# Patient Record
Sex: FEMALE | Race: WHITE | Employment: FULL TIME | URBAN - METROPOLITAN AREA
[De-identification: names, ages, dates, MRNs, and addresses within clinical notes are randomized per-mention and may not be internally consistent; named-entity substitution may affect disease eponyms.]

---

## 2017-06-04 ENCOUNTER — APPOINTMENT (OUTPATIENT)
Dept: ULTRASOUND IMAGING | Age: 27
End: 2017-06-04
Attending: EMERGENCY MEDICINE
Payer: COMMERCIAL

## 2017-06-04 ENCOUNTER — HOSPITAL ENCOUNTER (EMERGENCY)
Age: 27
Discharge: HOME | End: 2017-06-04
Attending: EMERGENCY MEDICINE
Payer: COMMERCIAL

## 2017-06-04 VITALS
HEIGHT: 64 IN | BODY MASS INDEX: 42.68 KG/M2 | DIASTOLIC BLOOD PRESSURE: 81 MMHG | HEART RATE: 74 BPM | TEMPERATURE: 98.7 F | RESPIRATION RATE: 20 BRPM | OXYGEN SATURATION: 100 % | SYSTOLIC BLOOD PRESSURE: 116 MMHG | WEIGHT: 250 LBS

## 2017-06-04 DIAGNOSIS — N76.0 BV (BACTERIAL VAGINOSIS): ICD-10-CM

## 2017-06-04 DIAGNOSIS — B96.89 BV (BACTERIAL VAGINOSIS): ICD-10-CM

## 2017-06-04 DIAGNOSIS — R10.2 PELVIC PAIN: Primary | ICD-10-CM

## 2017-06-04 DIAGNOSIS — N88.8 NABOTHIAN CYST: ICD-10-CM

## 2017-06-04 LAB
ALBUMIN SERPL BCP-MCNC: 3.7 G/DL (ref 3.5–5)
ALBUMIN/GLOB SERPL: 0.9 {RATIO} (ref 1.1–2.2)
ALP SERPL-CCNC: 53 U/L (ref 45–117)
ALT SERPL-CCNC: 40 U/L (ref 12–78)
ANION GAP BLD CALC-SCNC: 9 MMOL/L (ref 5–15)
APPEARANCE UR: ABNORMAL
AST SERPL W P-5'-P-CCNC: ABNORMAL U/L (ref 15–37)
BACTERIA URNS QL MICRO: ABNORMAL /HPF
BASOPHILS # BLD AUTO: 0 K/UL (ref 0–0.1)
BASOPHILS # BLD: 0 % (ref 0–1)
BILIRUB SERPL-MCNC: 0.4 MG/DL (ref 0.2–1)
BILIRUB UR QL: NEGATIVE
BUN SERPL-MCNC: 18 MG/DL (ref 6–20)
BUN/CREAT SERPL: 24 (ref 12–20)
CALCIUM SERPL-MCNC: 8.4 MG/DL (ref 8.5–10.1)
CHLORIDE SERPL-SCNC: 107 MMOL/L (ref 97–108)
CLUE CELLS VAG QL WET PREP: NORMAL
CO2 SERPL-SCNC: 26 MMOL/L (ref 21–32)
COLOR UR: ABNORMAL
CREAT SERPL-MCNC: 0.76 MG/DL (ref 0.55–1.02)
DIFFERENTIAL METHOD BLD: NORMAL
EOSINOPHIL # BLD: 0.3 K/UL (ref 0–0.4)
EOSINOPHIL NFR BLD: 4 % (ref 0–7)
EPITH CASTS URNS QL MICRO: ABNORMAL /LPF
ERYTHROCYTE [DISTWIDTH] IN BLOOD BY AUTOMATED COUNT: 12.3 % (ref 11.5–14.5)
GLOBULIN SER CALC-MCNC: 4.1 G/DL (ref 2–4)
GLUCOSE SERPL-MCNC: 93 MG/DL (ref 65–100)
GLUCOSE UR STRIP.AUTO-MCNC: NEGATIVE MG/DL
HCG UR QL: NEGATIVE
HCT VFR BLD AUTO: 39 % (ref 35–47)
HGB BLD-MCNC: 13.1 G/DL (ref 11.5–16)
HGB UR QL STRIP: NEGATIVE
KETONES UR QL STRIP.AUTO: NEGATIVE MG/DL
KOH PREP SPEC: NORMAL
LEUKOCYTE ESTERASE UR QL STRIP.AUTO: NEGATIVE
LIPASE SERPL-CCNC: 116 U/L (ref 73–393)
LYMPHOCYTES # BLD AUTO: 34 % (ref 12–49)
LYMPHOCYTES # BLD: 2.7 K/UL (ref 0.8–3.5)
MCH RBC QN AUTO: 30.4 PG (ref 26–34)
MCHC RBC AUTO-ENTMCNC: 33.6 G/DL (ref 30–36.5)
MCV RBC AUTO: 90.5 FL (ref 80–99)
MONOCYTES # BLD: 0.6 K/UL (ref 0–1)
MONOCYTES NFR BLD AUTO: 7 % (ref 5–13)
NEUTS SEG # BLD: 4.4 K/UL (ref 1.8–8)
NEUTS SEG NFR BLD AUTO: 55 % (ref 32–75)
NITRITE UR QL STRIP.AUTO: NEGATIVE
PH UR STRIP: 6 [PH] (ref 5–8)
PLATELET # BLD AUTO: 281 K/UL (ref 150–400)
POTASSIUM SERPL-SCNC: ABNORMAL MMOL/L (ref 3.5–5.1)
PROT SERPL-MCNC: 7.8 G/DL (ref 6.4–8.2)
PROT UR STRIP-MCNC: NEGATIVE MG/DL
RBC # BLD AUTO: 4.31 M/UL (ref 3.8–5.2)
RBC #/AREA URNS HPF: ABNORMAL /HPF (ref 0–5)
RBC MORPH BLD: NORMAL
SERVICE CMNT-IMP: NORMAL
SODIUM SERPL-SCNC: 142 MMOL/L (ref 136–145)
SP GR UR REFRACTOMETRY: 1.03 (ref 1–1.03)
T VAGINALIS VAG QL WET PREP: NORMAL
UA: UC IF INDICATED,UAUC: ABNORMAL
UROBILINOGEN UR QL STRIP.AUTO: 0.2 EU/DL (ref 0.2–1)
WBC # BLD AUTO: 8 K/UL (ref 3.6–11)
WBC URNS QL MICRO: ABNORMAL /HPF (ref 0–4)

## 2017-06-04 PROCEDURE — 87210 SMEAR WET MOUNT SALINE/INK: CPT | Performed by: EMERGENCY MEDICINE

## 2017-06-04 PROCEDURE — 76830 TRANSVAGINAL US NON-OB: CPT

## 2017-06-04 PROCEDURE — 85025 COMPLETE CBC W/AUTO DIFF WBC: CPT | Performed by: EMERGENCY MEDICINE

## 2017-06-04 PROCEDURE — 81001 URINALYSIS AUTO W/SCOPE: CPT | Performed by: EMERGENCY MEDICINE

## 2017-06-04 PROCEDURE — 87086 URINE CULTURE/COLONY COUNT: CPT | Performed by: EMERGENCY MEDICINE

## 2017-06-04 PROCEDURE — 76856 US EXAM PELVIC COMPLETE: CPT

## 2017-06-04 PROCEDURE — 83690 ASSAY OF LIPASE: CPT | Performed by: EMERGENCY MEDICINE

## 2017-06-04 PROCEDURE — 80053 COMPREHEN METABOLIC PANEL: CPT | Performed by: EMERGENCY MEDICINE

## 2017-06-04 PROCEDURE — 99284 EMERGENCY DEPT VISIT MOD MDM: CPT

## 2017-06-04 PROCEDURE — 87491 CHLMYD TRACH DNA AMP PROBE: CPT | Performed by: EMERGENCY MEDICINE

## 2017-06-04 PROCEDURE — 36415 COLL VENOUS BLD VENIPUNCTURE: CPT | Performed by: EMERGENCY MEDICINE

## 2017-06-04 PROCEDURE — 81025 URINE PREGNANCY TEST: CPT

## 2017-06-04 RX ORDER — SODIUM CHLORIDE 0.9 % (FLUSH) 0.9 %
5-10 SYRINGE (ML) INJECTION AS NEEDED
Status: DISCONTINUED | OUTPATIENT
Start: 2017-06-04 | End: 2017-06-05 | Stop reason: HOSPADM

## 2017-06-04 RX ORDER — METRONIDAZOLE 500 MG/1
500 TABLET ORAL 2 TIMES DAILY
Qty: 14 TAB | Refills: 0 | Status: SHIPPED | OUTPATIENT
Start: 2017-06-04 | End: 2017-06-11

## 2017-06-04 RX ORDER — SODIUM CHLORIDE 0.9 % (FLUSH) 0.9 %
5-10 SYRINGE (ML) INJECTION EVERY 8 HOURS
Status: DISCONTINUED | OUTPATIENT
Start: 2017-06-04 | End: 2017-06-05 | Stop reason: HOSPADM

## 2017-06-04 NOTE — ED TRIAGE NOTES
Pt has pelvic pain. Denies diarrhea, denies bleeding. Pt does not have periods for years. Denies urinary sx.

## 2017-06-04 NOTE — ED PROVIDER NOTES
HPI Comments: 24 y.o. A4 female with past medical history significant for appendectomy and seasonal allergies who presents ambulatory from home accompanied by her boyfriend with chief complaint of pelvic pain. Pt reports having pelvic pain, described as \"feels like menstrual cramps\" or \"like my ovaries\", for the past 6-7 years after having an elective . She states her pain has been intermittent since then, but is worse with standing straight or reaching over her head and better with sitting. She describes current pain as a 4/10. She has been taking Extra Strenght Tylenol which provides temporary relief. She admits she was seen by [facility] a few weeks ago for her symptoms and was given a 4 day course of antibiotics for a possible UTI. However, her symptoms did not improve. She also describes intermittent nausea and vomiting since onset of symptoms, but no vaginal bleeding, vaginal discharge, diarrhea, constipation, fever, or dysuria. She says her LMP was about 5 years ago but she is not currently on oral contraceptives or other hormonal therapy. She reports she was seen by her GYN for this in the past and was told that everything was \"normal\". There are no other acute medical concerns at this time. Social hx: Occasional smoker; rare EtOH use; no illicit drug use. Allergies: None  PCP: No primary care provider on file. Note written by Blanche Zavala. Julienne Gallardo, as dictated by Paty Matute, DO 7:07 PM      The history is provided by the patient. No past medical history on file. No past surgical history on file. No family history on file. Social History     Social History    Marital status: SINGLE     Spouse name: N/A    Number of children: N/A    Years of education: N/A     Occupational History    Not on file.      Social History Main Topics    Smoking status: Not on file    Smokeless tobacco: Not on file    Alcohol use Not on file    Drug use: Not on file    Sexual activity: Not on file     Other Topics Concern    Not on file     Social History Narrative    No narrative on file     ALLERGIES: Review of patient's allergies indicates no known allergies. Review of Systems   Constitutional: Negative for appetite change, chills, fever and unexpected weight change. HENT: Negative for ear pain, hearing loss, rhinorrhea and trouble swallowing. Eyes: Negative for pain and visual disturbance. Respiratory: Negative for cough, chest tightness and shortness of breath. Cardiovascular: Negative for chest pain and palpitations. Gastrointestinal: Positive for nausea and vomiting. Negative for abdominal distention, abdominal pain and blood in stool. Genitourinary: Positive for pelvic pain. Negative for dysuria, hematuria and urgency. Musculoskeletal: Negative for back pain and myalgias. Skin: Negative for rash. Neurological: Negative for dizziness, syncope, weakness and numbness. Psychiatric/Behavioral: Negative for confusion and suicidal ideas. All other systems reviewed and are negative. Vitals:    06/04/17 1903   BP: 128/71   Pulse: 76   Resp: 18   Temp: 98.1 °F (36.7 °C)   SpO2: 98%   Weight: 113.4 kg (250 lb)   Height: 5' 4\" (1.626 m)            Physical Exam   Constitutional: She is oriented to person, place, and time. She appears well-developed and well-nourished. No distress. HENT:   Head: Normocephalic and atraumatic. Right Ear: External ear normal.   Left Ear: External ear normal.   Nose: Nose normal.   Mouth/Throat: Oropharynx is clear and moist. No oropharyngeal exudate. Eyes: Conjunctivae and EOM are normal. Pupils are equal, round, and reactive to light. Right eye exhibits no discharge. Left eye exhibits no discharge. No scleral icterus. Neck: Normal range of motion. Neck supple. No JVD present. No tracheal deviation present. Cardiovascular: Normal rate, regular rhythm, normal heart sounds and intact distal pulses.   Exam reveals no gallop and no friction rub. No murmur heard. Pulmonary/Chest: Effort normal and breath sounds normal. No stridor. No respiratory distress. She has no decreased breath sounds. She has no wheezes. She has no rhonchi. She has no rales. She exhibits no tenderness. Abdominal: Soft. Bowel sounds are normal. She exhibits no distension. There is tenderness in the suprapubic area. There is no rebound and no guarding. Genitourinary: No labial fusion. There is no rash, tenderness, lesion or injury on the right labia. There is no rash, tenderness, lesion or injury on the left labia. Cervix exhibits no motion tenderness, no discharge and no friability. No tenderness in the vagina. No foreign body in the vagina. No vaginal discharge found. Musculoskeletal: Normal range of motion. She exhibits no edema or tenderness. Neurological: She is alert and oriented to person, place, and time. She has normal strength and normal reflexes. No cranial nerve deficit or sensory deficit. She exhibits normal muscle tone. Coordination normal. GCS eye subscore is 4. GCS verbal subscore is 5. GCS motor subscore is 6. Skin: Skin is warm and dry. No rash noted. She is not diaphoretic. No erythema. No pallor. Psychiatric: She has a normal mood and affect. Her behavior is normal. Judgment and thought content normal.   Nursing note and vitals reviewed. Note written by Jie Edwards.  [facility], as dictated by Sula Najjar, DO 7:06 PM    MDM  Number of Diagnoses or Management Options  BV (bacterial vaginosis):   Nabothian cyst:   Pelvic pain:      Amount and/or Complexity of Data Reviewed  Clinical lab tests: ordered and reviewed  Tests in the radiology section of CPT®: ordered and reviewed    Risk of Complications, Morbidity, and/or Mortality  Presenting problems: moderate  Diagnostic procedures: moderate  Management options: moderate    Patient Progress  Patient progress: stable    ED Course       Procedures  Chief Complaint Patient presents with    Pelvic Pain       12:56 AM  The patients presenting problems have been discussed, and they are in agreement with the care plan formulated and outlined with them. I have encouraged them to ask questions as they arise throughout their visit. MEDICATIONS GIVEN:  Medications   sodium chloride (NS) flush 5-10 mL (not administered)   sodium chloride (NS) flush 5-10 mL (not administered)       LABS REVIEWED:  Recent Results (from the past 24 hour(s))   URINALYSIS W/ REFLEX CULTURE    Collection Time: 06/04/17  8:27 PM   Result Value Ref Range    Color YELLOW/STRAW      Appearance CLOUDY (A) CLEAR      Specific gravity 1.030 1.003 - 1.030      pH (UA) 6.0 5.0 - 8.0      Protein NEGATIVE  NEG mg/dL    Glucose NEGATIVE  NEG mg/dL    Ketone NEGATIVE  NEG mg/dL    Bilirubin NEGATIVE  NEG      Blood NEGATIVE  NEG      Urobilinogen 0.2 0.2 - 1.0 EU/dL    Nitrites NEGATIVE  NEG      Leukocyte Esterase NEGATIVE  NEG      WBC 0-4 0 - 4 /hpf    RBC 0-5 0 - 5 /hpf    Epithelial cells MANY (A) FEW /lpf    Bacteria 1+ (A) NEG /hpf    UA:UC IF INDICATED URINE CULTURE ORDERED (A) CNI     CBC WITH AUTOMATED DIFF    Collection Time: 06/04/17  8:27 PM   Result Value Ref Range    WBC 8.0 3.6 - 11.0 K/uL    RBC 4.31 3.80 - 5.20 M/uL    HGB 13.1 11.5 - 16.0 g/dL    HCT 39.0 35.0 - 47.0 %    MCV 90.5 80.0 - 99.0 FL    MCH 30.4 26.0 - 34.0 PG    MCHC 33.6 30.0 - 36.5 g/dL    RDW 12.3 11.5 - 14.5 %    PLATELET 539 728 - 101 K/uL    NEUTROPHILS 55 32 - 75 %    LYMPHOCYTES 34 12 - 49 %    MONOCYTES 7 5 - 13 %    EOSINOPHILS 4 0 - 7 %    BASOPHILS 0 0 - 1 %    ABS. NEUTROPHILS 4.4 1.8 - 8.0 K/UL    ABS. LYMPHOCYTES 2.7 0.8 - 3.5 K/UL    ABS. MONOCYTES 0.6 0.0 - 1.0 K/UL    ABS. EOSINOPHILS 0.3 0.0 - 0.4 K/UL    ABS.  BASOPHILS 0.0 0.0 - 0.1 K/UL    DF MANUAL      RBC COMMENTS NORMOCYTIC, NORMOCHROMIC     METABOLIC PANEL, COMPREHENSIVE    Collection Time: 06/04/17  8:27 PM   Result Value Ref Range    Sodium 142 136 - 145 mmol/L    Potassium HEMOLYZED,RECOLLECT REQUESTED 3.5 - 5.1 mmol/L    Chloride 107 97 - 108 mmol/L    CO2 26 21 - 32 mmol/L    Anion gap 9 5 - 15 mmol/L    Glucose 93 65 - 100 mg/dL    BUN 18 6 - 20 MG/DL    Creatinine 0.76 0.55 - 1.02 MG/DL    BUN/Creatinine ratio 24 (H) 12 - 20      GFR est AA >60 >60 ml/min/1.73m2    GFR est non-AA >60 >60 ml/min/1.73m2    Calcium 8.4 (L) 8.5 - 10.1 MG/DL    Bilirubin, total 0.4 0.2 - 1.0 MG/DL    ALT (SGPT) 40 12 - 78 U/L    AST (SGOT) HEMOLYZED,RECOLLECT REQUESTED 15 - 37 U/L    Alk. phosphatase 53 45 - 117 U/L    Protein, total 7.8 6.4 - 8.2 g/dL    Albumin 3.7 3.5 - 5.0 g/dL    Globulin 4.1 (H) 2.0 - 4.0 g/dL    A-G Ratio 0.9 (L) 1.1 - 2.2     LIPASE    Collection Time: 06/04/17  8:27 PM   Result Value Ref Range    Lipase 116 73 - 393 U/L   KOH, OTHER SOURCES    Collection Time: 06/04/17  8:45 PM   Result Value Ref Range    Special Requests: NO SPECIAL REQUESTS      KOH NO YEAST SEEN     WET PREP    Collection Time: 06/04/17  8:45 PM   Result Value Ref Range    Clue cells CLUE CELLS PRESENT      Wet prep NO TRICHOMONAS SEEN     HCG URINE, QL. - POC    Collection Time: 06/04/17  8:49 PM   Result Value Ref Range    Pregnancy test,urine (POC) NEGATIVE  NEG         VITAL SIGNS:  Patient Vitals for the past 12 hrs:   Temp Pulse Resp BP SpO2   06/04/17 2122 98.7 °F (37.1 °C) 74 20 116/81 100 %   06/04/17 1903 98.1 °F (36.7 °C) 76 18 128/71 98 %       RADIOLOGY RESULTS:  The following have been ordered and reviewed:  US PELV NON OBS   Final Result      US TRANSVAGINAL   Final Result        Study Result      HISTORY: Pelvic pain     Technique: Routine transpelvic ultrasound images were obtained as well as  transvaginal images for better definition of the uterus and adnexa.     FINDINGS: The transpelvic images demonstrate a nondistended urinary bladder  without evidence of filling defect. The uterus measures 7.3 x 4.7 x 3.1 cm.   Myometrial echogenicity is normal. The endometrial stripe measures 6 mm by  transpelvic technique. The ovaries are not visualized by transpelvic technique  due to underdistention of the bladder.     Transvaginal images demonstrate a normal endometrial stripe measuring 5 mm in  thickness. No myometrial mass is seen. The cervix contains a nabothian cyst.  The right ovary measures 2.7 x 2.5 x 2.9 cm. The left ovary measures 2.8 x 2.7  x 2.3 cm. There is intact vascularity to both ovaries. No adnexal mass or  pathological cyst is seen. There is no pelvic free fluid.      IMPRESSION  IMPRESSION:  Normal pelvic ultrasound. PROGRESS NOTES:  Discussed results and plan with patient. Patient will be discharged home with OB/Gyn followup. Patient instructed to return to the emergency room for any worsening symptoms or any other concerns. DIAGNOSIS:    1. Pelvic pain    2. BV (bacterial vaginosis)    3. Nabothian cyst        PLAN:  Follow-up Information     Follow up With Details Comments Contact Info    your OB/Gyn Schedule an appointment as soon as possible for a visit      [facility] EMERGENCY DEPT  If symptoms worsen [address] [facility]  [phone]        Discharge Medication List as of 6/4/2017 10:04 PM      START taking these medications    Details   metroNIDAZOLE (FLAGYL) 500 mg tablet Take 1 Tab by mouth two (2) times a day for 7 days. , Print, Disp-14 Tab, R-0               ED COURSE: The patients hospital course has been uncomplicated.

## 2017-06-05 LAB
C TRACH DNA SPEC QL NAA+PROBE: NEGATIVE
N GONORRHOEA DNA SPEC QL NAA+PROBE: NEGATIVE
SAMPLE TYPE: NORMAL
SERVICE CMNT-IMP: NORMAL
SPECIMEN SOURCE: NORMAL

## 2017-06-05 NOTE — DISCHARGE INSTRUCTIONS
We hope that we have addressed all of your medical concerns. The examination and treatment you received in the Emergency Department were for an emergent problem and were not intended as complete care. It is important that you follow up with your healthcare provider(s) for ongoing care. If your symptoms worsen or do not improve as expected, and you are unable to reach your usual health care provider(s), you should return to the Emergency Department. Today's healthcare is undergoing tremendous change, and patient satisfaction surveys are one of the many tools to assess the quality of medical care. You may receive a survey from the [employer] regarding your experience in the Emergency Department. I hope that your experience has been completely positive, particularly the medical care that I provided. As such, please participate in the survey; anything less than excellent does not meet my expectations or intentions. Formerly [facility] and [facility] participate in nationally recognized quality of care measures. If your blood pressure is greater than 120/80, as reported below, we urge that you seek medical care to address the potential of high blood pressure, commonly known as hypertension. Hypertension can be hereditary or can be caused by certain medical conditions, pain, stress, or \"white coat syndrome. \"       Please make an appointment with your health care provider(s) for follow up of your Emergency Department visit. VITALS:   Patient Vitals for the past 8 hrs:   Temp Pulse Resp BP SpO2   06/04/17 2122 98.7 °F (37.1 °C) 74 20 116/81 100 %   06/04/17 1903 98.1 °F (36.7 °C) 76 18 128/71 98 %          Thank you for allowing us to provide you with medical care today. We realize that you have many choices for your emergency care needs. Please choose us in the future for any continued health care needs. [facility]ana [facility] [address].   Office: [phone]            Recent Results (from the past 24 hour(s))   URINALYSIS W/ REFLEX CULTURE    Collection Time: 06/04/17  8:27 PM   Result Value Ref Range    Color YELLOW/STRAW      Appearance CLOUDY (A) CLEAR      Specific gravity 1.030 1.003 - 1.030      pH (UA) 6.0 5.0 - 8.0      Protein NEGATIVE  NEG mg/dL    Glucose NEGATIVE  NEG mg/dL    Ketone NEGATIVE  NEG mg/dL    Bilirubin NEGATIVE  NEG      Blood NEGATIVE  NEG      Urobilinogen 0.2 0.2 - 1.0 EU/dL    Nitrites NEGATIVE  NEG      Leukocyte Esterase NEGATIVE  NEG      WBC 0-4 0 - 4 /hpf    RBC 0-5 0 - 5 /hpf    Epithelial cells MANY (A) FEW /lpf    Bacteria 1+ (A) NEG /hpf    UA:UC IF INDICATED URINE CULTURE ORDERED (A) CNI     CBC WITH AUTOMATED DIFF    Collection Time: 06/04/17  8:27 PM   Result Value Ref Range    WBC 8.0 3.6 - 11.0 K/uL    RBC 4.31 3.80 - 5.20 M/uL    HGB 13.1 11.5 - 16.0 g/dL    HCT 39.0 35.0 - 47.0 %    MCV 90.5 80.0 - 99.0 FL    MCH 30.4 26.0 - 34.0 PG    MCHC 33.6 30.0 - 36.5 g/dL    RDW 12.3 11.5 - 14.5 %    PLATELET 586 851 - 324 K/uL    NEUTROPHILS 55 32 - 75 %    LYMPHOCYTES 34 12 - 49 %    MONOCYTES 7 5 - 13 %    EOSINOPHILS 4 0 - 7 %    BASOPHILS 0 0 - 1 %    ABS. NEUTROPHILS 4.4 1.8 - 8.0 K/UL    ABS. LYMPHOCYTES 2.7 0.8 - 3.5 K/UL    ABS. MONOCYTES 0.6 0.0 - 1.0 K/UL    ABS. EOSINOPHILS 0.3 0.0 - 0.4 K/UL    ABS.  BASOPHILS 0.0 0.0 - 0.1 K/UL    DF MANUAL      RBC COMMENTS NORMOCYTIC, NORMOCHROMIC     METABOLIC PANEL, COMPREHENSIVE    Collection Time: 06/04/17  8:27 PM   Result Value Ref Range    Sodium 142 136 - 145 mmol/L    Potassium HEMOLYZED,RECOLLECT REQUESTED 3.5 - 5.1 mmol/L    Chloride 107 97 - 108 mmol/L    CO2 26 21 - 32 mmol/L    Anion gap 9 5 - 15 mmol/L    Glucose 93 65 - 100 mg/dL    BUN 18 6 - 20 MG/DL    Creatinine 0.76 0.55 - 1.02 MG/DL    BUN/Creatinine ratio 24 (H) 12 - 20      GFR est AA >60 >60 ml/min/1.73m2    GFR est non-AA >60 >60 ml/min/1.73m2    Calcium 8.4 (L) 8.5 - 10.1 MG/DL    Bilirubin, total 0.4 0.2 - 1.0 MG/DL    ALT (SGPT) 40 12 - 78 U/L    AST (SGOT) HEMOLYZED,RECOLLECT REQUESTED 15 - 37 U/L    Alk. phosphatase 53 45 - 117 U/L    Protein, total 7.8 6.4 - 8.2 g/dL    Albumin 3.7 3.5 - 5.0 g/dL    Globulin 4.1 (H) 2.0 - 4.0 g/dL    A-G Ratio 0.9 (L) 1.1 - 2.2     LIPASE    Collection Time: 06/04/17  8:27 PM   Result Value Ref Range    Lipase 116 73 - 393 U/L   KOH, OTHER SOURCES    Collection Time: 06/04/17  8:45 PM   Result Value Ref Range    Special Requests: NO SPECIAL REQUESTS      KOH NO YEAST SEEN     WET PREP    Collection Time: 06/04/17  8:45 PM   Result Value Ref Range    Clue cells CLUE CELLS PRESENT      Wet prep NO TRICHOMONAS SEEN     HCG URINE, QL. - POC    Collection Time: 06/04/17  8:49 PM   Result Value Ref Range    Pregnancy test,urine (POC) NEGATIVE  NEG         Us Transvaginal    Result Date: 6/4/2017  HISTORY: Pelvic pain Technique: Routine transpelvic ultrasound images were obtained as well as transvaginal images for better definition of the uterus and adnexa. FINDINGS: The transpelvic images demonstrate a nondistended urinary bladder without evidence of filling defect. The uterus measures 7.3 x 4.7 x 3.1 cm. Myometrial echogenicity is normal. The endometrial stripe measures 6 mm by transpelvic technique. The ovaries are not visualized by transpelvic technique due to underdistention of the bladder. Transvaginal images demonstrate a normal endometrial stripe measuring 5 mm in thickness. No myometrial mass is seen. The cervix contains a nabothian cyst. The right ovary measures 2.7 x 2.5 x 2.9 cm. The left ovary measures 2.8 x 2.7 x 2.3 cm. There is intact vascularity to both ovaries. No adnexal mass or pathological cyst is seen. There is no pelvic free fluid. IMPRESSION: Normal pelvic ultrasound.      Us Pelv Non Obs    Result Date: 6/4/2017  HISTORY: Pelvic pain Technique: Routine transpelvic ultrasound images were obtained as well as transvaginal images for better definition of the uterus and adnexa. FINDINGS: The transpelvic images demonstrate a nondistended urinary bladder without evidence of filling defect. The uterus measures 7.3 x 4.7 x 3.1 cm. Myometrial echogenicity is normal. The endometrial stripe measures 6 mm by transpelvic technique. The ovaries are not visualized by transpelvic technique due to underdistention of the bladder. Transvaginal images demonstrate a normal endometrial stripe measuring 5 mm in thickness. No myometrial mass is seen. The cervix contains a nabothian cyst. The right ovary measures 2.7 x 2.5 x 2.9 cm. The left ovary measures 2.8 x 2.7 x 2.3 cm. There is intact vascularity to both ovaries. No adnexal mass or pathological cyst is seen. There is no pelvic free fluid. IMPRESSION: Normal pelvic ultrasound. Pelvic Pain: Care Instructions  Your Care Instructions    Pelvic pain, or pain in the lower belly, can have many causes. Often pelvic pain is not serious and gets better in a few days. If your pain continues or gets worse, you may need tests and treatment. Tell your doctor about any new symptoms. These may be signs of a serious problem. Follow-up care is a key part of your treatment and safety. Be sure to make and go to all appointments, and call your doctor if you are having problems. It's also a good idea to know your test results and keep a list of the medicines you take. How can you care for yourself at home? · Rest until you feel better. Lie down, and raise your legs by placing a pillow under your knees. · Drink plenty of fluids. You may find that small, frequent sips are easier on your stomach than if you drink a lot at once. Avoid drinks with carbonation or caffeine, such as soda pop, tea, or coffee. · Try eating several small meals instead of 2 or 3 large ones. Eat mild foods, such as rice, dry toast or crackers, bananas, and applesauce.  Avoid fatty and spicy foods, other fruits, and alcohol until 48 hours after your symptoms have gone away. · Take an over-the-counter pain medicine, such as acetaminophen (Tylenol), ibuprofen (Advil, Motrin), or naproxen (Aleve). Read and follow all instructions on the label. · Do not take two or more pain medicines at the same time unless the doctor told you to. Many pain medicines have acetaminophen, which is Tylenol. Too much acetaminophen (Tylenol) can be harmful. · You can put a heating pad, a warm cloth, or moist heat on your belly to relieve pain. When should you call for help? Call 911 anytime you think you may need emergency care. For example, call if:  · You passed out (lost consciousness). Call your doctor now or seek immediate medical care if:  · Your pain is getting worse. · Your pain becomes focused in one area of your belly. · You have severe vaginal bleeding. Severe means that you are soaking through your usual pads or tampons every hour for 2 or more hours and passing clots of blood. · You have new symptoms such as fever, urinary problems or unexpected vaginal bleeding. · You are dizzy or lightheaded, or you feel like you may faint. Watch closely for changes in your health, and be sure to contact your doctor if:  · You do not get better as expected. Where can you learn more? Go to http://tonie-eloy.info/. Enter [phone] in the search box to learn more about \"Pelvic Pain: Care Instructions. \"  Current as of: October 13, 2016  Content Version: 11.2  © 8687-4485 [employer]. Care instructions adapted under license by [employer] (which disclaims liability or warranty for this information). If you have questions about a medical condition or this instruction, always ask your healthcare professional. [address] any warranty or liability for your use of this information.          Bacterial Vaginosis: Care Instructions  Your Care Instructions    Bacterial vaginosis is a type of vaginal infection. It is caused by excess growth of certain bacteria that are normally found in the vagina. Symptoms can include itching, swelling, pain when you urinate or have sex, and a gray or yellow discharge with a \"fishy\" odor. It is not considered an infection that is spread through sexual contact. Although symptoms can be annoying and uncomfortable, bacterial vaginosis does not usually cause other health problems. However, if you have it while you are pregnant, it can cause complications. While the infection may go away on its own, most doctors use antibiotics to treat it. You may have been prescribed pills or vaginal cream. With treatment, bacterial vaginosis usually clears up in 5 to 7 days. Follow-up care is a key part of your treatment and safety. Be sure to make and go to all appointments, and call your doctor if you are having problems. It's also a good idea to know your test results and keep a list of the medicines you take. How can you care for yourself at home? · Take your antibiotics as directed. Do not stop taking them just because you feel better. You need to take the full course of antibiotics. · Do not eat or drink anything that contains alcohol if you are taking metronidazole (Flagyl). · Keep using your medicine if you start your period. Use pads instead of tampons while using a vaginal cream or suppository. Tampons can absorb the medicine. · Wear loose cotton clothing. Do not wear nylon and other materials that hold body heat and moisture close to the skin. · Do not scratch. Relieve itching with a cold pack or a cool bath. · Do not wash your vaginal area more than once a day. Use plain water or a mild, unscented soap. Do not douche. When should you call for help? Watch closely for changes in your health, and be sure to contact your doctor if:  · You have unexpected vaginal bleeding. · You have a fever.   · You have new or increased pain in your vagina or pelvis. · You are not getting better after 1 week. · Your symptoms return after you finish the course of your medicine. Where can you learn more? Go to http://tonie-eloy.info/. Brian Arcos in the search box to learn more about \"Bacterial Vaginosis: Care Instructions. \"  Current as of: October 13, 2016  Content Version: 11.2  © 4326-6399 [employer]. Care instructions adapted under license by [employer] (which disclaims liability or warranty for this information). If you have questions about a medical condition or this instruction, always ask your healthcare professional. [address] any warranty or liability for your use of this information.

## 2017-06-06 LAB
BACTERIA SPEC CULT: NORMAL
CC UR VC: NORMAL
SERVICE CMNT-IMP: NORMAL

## 2019-03-20 ENCOUNTER — APPOINTMENT (OUTPATIENT)
Dept: ULTRASOUND IMAGING | Age: 29
End: 2019-03-20
Attending: EMERGENCY MEDICINE
Payer: COMMERCIAL

## 2019-03-20 ENCOUNTER — APPOINTMENT (OUTPATIENT)
Dept: CT IMAGING | Age: 29
End: 2019-03-20
Attending: EMERGENCY MEDICINE
Payer: COMMERCIAL

## 2019-03-20 ENCOUNTER — HOSPITAL ENCOUNTER (EMERGENCY)
Age: 29
Discharge: HOME | End: 2019-03-21
Attending: EMERGENCY MEDICINE
Payer: COMMERCIAL

## 2019-03-20 DIAGNOSIS — R10.13 ABDOMINAL PAIN, EPIGASTRIC: Primary | ICD-10-CM

## 2019-03-20 LAB
ALBUMIN SERPL-MCNC: 3.6 G/DL (ref 3.5–5)
ALBUMIN/GLOB SERPL: 0.9 {RATIO} (ref 1.1–2.2)
ALP SERPL-CCNC: 67 U/L (ref 45–117)
ALT SERPL-CCNC: 47 U/L (ref 12–78)
ANION GAP SERPL CALC-SCNC: 6 MMOL/L (ref 5–15)
APPEARANCE UR: CLEAR
AST SERPL-CCNC: 29 U/L (ref 15–37)
BACTERIA URNS QL MICRO: NEGATIVE /HPF
BASOPHILS # BLD: 0 K/UL (ref 0–0.1)
BASOPHILS NFR BLD: 0 % (ref 0–1)
BILIRUB SERPL-MCNC: 0.2 MG/DL (ref 0.2–1)
BILIRUB UR QL CFM: NEGATIVE
BUN SERPL-MCNC: 16 MG/DL (ref 6–20)
BUN/CREAT SERPL: 20 (ref 12–20)
CALCIUM SERPL-MCNC: 8.5 MG/DL (ref 8.5–10.1)
CHLORIDE SERPL-SCNC: 106 MMOL/L (ref 97–108)
CO2 SERPL-SCNC: 25 MMOL/L (ref 21–32)
COLOR UR: ABNORMAL
CREAT SERPL-MCNC: 0.8 MG/DL (ref 0.55–1.02)
DIFFERENTIAL METHOD BLD: NORMAL
EOSINOPHIL # BLD: 0.1 K/UL (ref 0–0.4)
EOSINOPHIL NFR BLD: 2 % (ref 0–7)
EPITH CASTS URNS QL MICRO: ABNORMAL /LPF
ERYTHROCYTE [DISTWIDTH] IN BLOOD BY AUTOMATED COUNT: 12.5 % (ref 11.5–14.5)
GLOBULIN SER CALC-MCNC: 4 G/DL (ref 2–4)
GLUCOSE SERPL-MCNC: 80 MG/DL (ref 65–100)
GLUCOSE UR STRIP.AUTO-MCNC: NEGATIVE MG/DL
HCG UR QL: NEGATIVE
HCT VFR BLD AUTO: 41.5 % (ref 35–47)
HGB BLD-MCNC: 14.1 G/DL (ref 11.5–16)
HGB UR QL STRIP: NEGATIVE
HYALINE CASTS URNS QL MICRO: ABNORMAL /LPF (ref 0–5)
IMM GRANULOCYTES # BLD AUTO: 0 K/UL (ref 0–0.04)
IMM GRANULOCYTES NFR BLD AUTO: 0 % (ref 0–0.5)
KETONES UR QL STRIP.AUTO: 40 MG/DL
LEUKOCYTE ESTERASE UR QL STRIP.AUTO: NEGATIVE
LYMPHOCYTES # BLD: 1.7 K/UL (ref 0.8–3.5)
LYMPHOCYTES NFR BLD: 25 % (ref 12–49)
MCH RBC QN AUTO: 30.3 PG (ref 26–34)
MCHC RBC AUTO-ENTMCNC: 34 G/DL (ref 30–36.5)
MCV RBC AUTO: 89.2 FL (ref 80–99)
MONOCYTES # BLD: 0.6 K/UL (ref 0–1)
MONOCYTES NFR BLD: 9 % (ref 5–13)
NEUTS SEG # BLD: 4.3 K/UL (ref 1.8–8)
NEUTS SEG NFR BLD: 64 % (ref 32–75)
NITRITE UR QL STRIP.AUTO: NEGATIVE
NRBC # BLD: 0 K/UL (ref 0–0.01)
NRBC BLD-RTO: 0 PER 100 WBC
PH UR STRIP: 5.5 [PH] (ref 5–8)
PLATELET # BLD AUTO: 265 K/UL (ref 150–400)
PMV BLD AUTO: 9.7 FL (ref 8.9–12.9)
POTASSIUM SERPL-SCNC: 3.8 MMOL/L (ref 3.5–5.1)
PROT SERPL-MCNC: 7.6 G/DL (ref 6.4–8.2)
PROT UR STRIP-MCNC: NEGATIVE MG/DL
RBC # BLD AUTO: 4.65 M/UL (ref 3.8–5.2)
RBC #/AREA URNS HPF: ABNORMAL /HPF (ref 0–5)
SODIUM SERPL-SCNC: 137 MMOL/L (ref 136–145)
SP GR UR REFRACTOMETRY: 1.03 (ref 1–1.03)
UR CULT HOLD, URHOLD: NORMAL
UROBILINOGEN UR QL STRIP.AUTO: 0.2 EU/DL (ref 0.2–1)
WBC # BLD AUTO: 6.6 K/UL (ref 3.6–11)
WBC URNS QL MICRO: ABNORMAL /HPF (ref 0–4)

## 2019-03-20 PROCEDURE — 36415 COLL VENOUS BLD VENIPUNCTURE: CPT

## 2019-03-20 PROCEDURE — 96361 HYDRATE IV INFUSION ADD-ON: CPT

## 2019-03-20 PROCEDURE — 81001 URINALYSIS AUTO W/SCOPE: CPT

## 2019-03-20 PROCEDURE — 74011250636 HC RX REV CODE- 250/636: Performed by: EMERGENCY MEDICINE

## 2019-03-20 PROCEDURE — 74177 CT ABD & PELVIS W/CONTRAST: CPT

## 2019-03-20 PROCEDURE — 83690 ASSAY OF LIPASE: CPT

## 2019-03-20 PROCEDURE — 81025 URINE PREGNANCY TEST: CPT

## 2019-03-20 PROCEDURE — 74011636320 HC RX REV CODE- 636/320: Performed by: EMERGENCY MEDICINE

## 2019-03-20 PROCEDURE — 80053 COMPREHEN METABOLIC PANEL: CPT

## 2019-03-20 PROCEDURE — 74011250637 HC RX REV CODE- 250/637: Performed by: EMERGENCY MEDICINE

## 2019-03-20 PROCEDURE — 96374 THER/PROPH/DIAG INJ IV PUSH: CPT

## 2019-03-20 PROCEDURE — 74011000250 HC RX REV CODE- 250: Performed by: EMERGENCY MEDICINE

## 2019-03-20 PROCEDURE — 96375 TX/PRO/DX INJ NEW DRUG ADDON: CPT

## 2019-03-20 PROCEDURE — 76705 ECHO EXAM OF ABDOMEN: CPT

## 2019-03-20 PROCEDURE — 99285 EMERGENCY DEPT VISIT HI MDM: CPT

## 2019-03-20 PROCEDURE — 85025 COMPLETE CBC W/AUTO DIFF WBC: CPT

## 2019-03-20 RX ORDER — FAMOTIDINE 20 MG/1
20 TABLET, FILM COATED ORAL 2 TIMES DAILY
Qty: 20 TAB | Refills: 0 | Status: SHIPPED | OUTPATIENT
Start: 2019-03-20 | End: 2019-03-21 | Stop reason: ALTCHOICE

## 2019-03-20 RX ORDER — ONDANSETRON 2 MG/ML
4 INJECTION INTRAMUSCULAR; INTRAVENOUS
Status: COMPLETED | OUTPATIENT
Start: 2019-03-20 | End: 2019-03-20

## 2019-03-20 RX ORDER — MORPHINE SULFATE 4 MG/ML
6 INJECTION INTRAVENOUS
Status: COMPLETED | OUTPATIENT
Start: 2019-03-20 | End: 2019-03-20

## 2019-03-20 RX ORDER — IBUPROFEN 200 MG
800 TABLET ORAL
COMMUNITY

## 2019-03-20 RX ORDER — ONDANSETRON 4 MG/1
4 TABLET, ORALLY DISINTEGRATING ORAL
Qty: 10 TAB | Refills: 0 | Status: SHIPPED | OUTPATIENT
Start: 2019-03-20

## 2019-03-20 RX ADMIN — MORPHINE SULFATE 6 MG: 4 INJECTION, SOLUTION INTRAMUSCULAR; INTRAVENOUS at 22:01

## 2019-03-20 RX ADMIN — LIDOCAINE HYDROCHLORIDE 40 ML: 20 SOLUTION ORAL; TOPICAL at 23:38

## 2019-03-20 RX ADMIN — IOPAMIDOL 100 ML: 755 INJECTION, SOLUTION INTRAVENOUS at 23:04

## 2019-03-20 RX ADMIN — SODIUM CHLORIDE 1000 ML: 900 INJECTION, SOLUTION INTRAVENOUS at 21:19

## 2019-03-20 RX ADMIN — ONDANSETRON 4 MG: 2 INJECTION INTRAMUSCULAR; INTRAVENOUS at 21:19

## 2019-03-20 NOTE — LETTER
NOTIFICATION RETURN TO WORK / SCHOOL 
 
3/21/2019 12:56 AM 
 
Ms. Elias Draper [address] [address] To Whom It May Concern: 
 
Elias Draper is currently under the care of [facility] EMERGENCY DEPT. She will return to work/school on: 3/25/19 If there are questions or concerns please have the patient contact our office.  
 
 
 
Sincerely,

## 2019-03-21 VITALS
HEART RATE: 94 BPM | DIASTOLIC BLOOD PRESSURE: 92 MMHG | TEMPERATURE: 99 F | WEIGHT: 275 LBS | RESPIRATION RATE: 20 BRPM | HEIGHT: 64 IN | BODY MASS INDEX: 46.95 KG/M2 | OXYGEN SATURATION: 98 % | SYSTOLIC BLOOD PRESSURE: 107 MMHG

## 2019-03-21 LAB — LIPASE SERPL-CCNC: 57 U/L (ref 73–393)

## 2019-03-21 PROCEDURE — 96375 TX/PRO/DX INJ NEW DRUG ADDON: CPT

## 2019-03-21 PROCEDURE — 74011250636 HC RX REV CODE- 250/636: Performed by: EMERGENCY MEDICINE

## 2019-03-21 PROCEDURE — 74011250637 HC RX REV CODE- 250/637: Performed by: EMERGENCY MEDICINE

## 2019-03-21 PROCEDURE — C9113 INJ PANTOPRAZOLE SODIUM, VIA: HCPCS | Performed by: EMERGENCY MEDICINE

## 2019-03-21 RX ORDER — HYDROCODONE BITARTRATE AND ACETAMINOPHEN 5; 325 MG/1; MG/1
1-2 TABLET ORAL
Qty: 12 TAB | Refills: 0 | Status: SHIPPED | OUTPATIENT
Start: 2019-03-21 | End: 2019-03-24

## 2019-03-21 RX ORDER — SUCRALFATE 1 G/1
1 TABLET ORAL
Status: DISCONTINUED | OUTPATIENT
Start: 2019-03-21 | End: 2019-03-21 | Stop reason: HOSPADM

## 2019-03-21 RX ORDER — HYDROCODONE BITARTRATE AND ACETAMINOPHEN 10; 325 MG/1; MG/1
1 TABLET ORAL
Status: COMPLETED | OUTPATIENT
Start: 2019-03-21 | End: 2019-03-21

## 2019-03-21 RX ORDER — PANTOPRAZOLE SODIUM 40 MG/10ML
40 INJECTION, POWDER, LYOPHILIZED, FOR SOLUTION INTRAVENOUS
Status: COMPLETED | OUTPATIENT
Start: 2019-03-21 | End: 2019-03-21

## 2019-03-21 RX ORDER — SUCRALFATE 1 G/10ML
1 SUSPENSION ORAL 4 TIMES DAILY
Qty: 414 ML | Refills: 0 | Status: SHIPPED | OUTPATIENT
Start: 2019-03-21

## 2019-03-21 RX ADMIN — SUCRALFATE 1 G: 1 TABLET ORAL at 00:44

## 2019-03-21 RX ADMIN — PANTOPRAZOLE SODIUM 40 MG: 40 INJECTION, POWDER, FOR SOLUTION INTRAVENOUS at 00:43

## 2019-03-21 RX ADMIN — HYDROCODONE BITARTRATE AND ACETAMINOPHEN 1 TABLET: 10; 325 TABLET ORAL at 00:44

## 2019-03-21 NOTE — DISCHARGE INSTRUCTIONS
Patient Education      Take the prescriptions as directed. You can use the Norco for the next day or two. Please call and arrange close follow up with the gastroenterologist for further evaluation and work up. If pain worsens, fever, vomiting, return here. Abdominal Pain: Care Instructions  Your Care Instructions    Abdominal pain has many possible causes. Some aren't serious and get better on their own in a few days. Others need more testing and treatment. If your pain continues or gets worse, you need to be rechecked and may need more tests to find out what is wrong. You may need surgery to correct the problem. Don't ignore new symptoms, such as fever, nausea and vomiting, urination problems, pain that gets worse, and dizziness. These may be signs of a more serious problem. Your doctor may have recommended a follow-up visit in the next 8 to 12 hours. If you are not getting better, you may need more tests or treatment. The doctor has checked you carefully, but problems can develop later. If you notice any problems or new symptoms, get medical treatment right away. Follow-up care is a key part of your treatment and safety. Be sure to make and go to all appointments, and call your doctor if you are having problems. It's also a good idea to know your test results and keep a list of the medicines you take. How can you care for yourself at home? · Rest until you feel better. · To prevent dehydration, drink plenty of fluids, enough so that your urine is light yellow or clear like water. Choose water and other caffeine-free clear liquids until you feel better. If you have kidney, heart, or liver disease and have to limit fluids, talk with your doctor before you increase the amount of fluids you drink. · If your stomach is upset, eat mild foods, such as rice, dry toast or crackers, bananas, and applesauce. Try eating several small meals instead of two or three large ones.   · Wait until 48 hours after all symptoms have gone away before you have spicy foods, alcohol, and drinks that contain caffeine. · Do not eat foods that are high in fat. · Avoid anti-inflammatory medicines such as aspirin, ibuprofen (Advil, Motrin), and naproxen (Aleve). These can cause stomach upset. Talk to your doctor if you take daily aspirin for another health problem. When should you call for help? Call 911 anytime you think you may need emergency care. For example, call if:    · You passed out (lost consciousness).     · You pass maroon or very bloody stools.     · You vomit blood or what looks like coffee grounds.     · You have new, severe belly pain.    Call your doctor now or seek immediate medical care if:    · Your pain gets worse, especially if it becomes focused in one area of your belly.     · You have a new or higher fever.     · Your stools are black and look like tar, or they have streaks of blood.     · You have unexpected vaginal bleeding.     · You have symptoms of a urinary tract infection. These may include:  ? Pain when you urinate. ? Urinating more often than usual.  ? Blood in your urine.     · You are dizzy or lightheaded, or you feel like you may faint.    Watch closely for changes in your health, and be sure to contact your doctor if:    · You are not getting better after 1 day (24 hours). Where can you learn more? Go to http://tonie-eloy.info/. Enter H848 in the search box to learn more about \"Abdominal Pain: Care Instructions. \"  Current as of: September 23, 2018  Content Version: 11.9  © 5717-2558 [employer]. Care instructions adapted under license by [employer] (which disclaims liability or warranty for this information). If you have questions about a medical condition or this instruction, always ask your healthcare professional. [address] any warranty or liability for your use of this information.

## 2019-03-21 NOTE — ED TRIAGE NOTES
Triage: Reports having RUQ abdominal pain for the last 3 days.  + N/V/D, poor appetite. Denies fever.

## 2019-07-05 NOTE — ED PROVIDER NOTES
34 y.o. female with no significant past medical history who presents via private vehicle from home accompanied by a friend with chief complaint of abdominal pain. Patient arrives c/o RUQ and epigastric abdominal pain since last week, worse x3 days. Patient states she was evaluated at her PCP last Tuesday for the same - had UA done and was prescribed abx for suspected UTI - no relief. Patient then reports taking ibuprofen for symptoms with no relief. She notes taking ~25 pills in the last 3 days. Patient reports fever (max. T-102) and n/v/d and reports pain is worse with liquids/PO intake. Patient denies SOB. There are no other acute medical concerns at this time. Social hx: Never tobacco smoker; Denies EtOH use; Denies illicit drug use PCP: None Note written by Sarah Costello, as dictated by Shasha Godoy MD 9:09 PM 
 
 
  
 
History reviewed. No pertinent past medical history. Past Surgical History:  
Procedure Laterality Date  HX APPENDECTOMY History reviewed. No pertinent family history. Social History Socioeconomic History  Marital status: SINGLE Spouse name: Not on file  Number of children: Not on file  Years of education: Not on file  Highest education level: Not on file Occupational History  Not on file Social Needs  Financial resource strain: Not on file  Food insecurity:  
  Worry: Not on file Inability: Not on file  Transportation needs:  
  Medical: Not on file Non-medical: Not on file Tobacco Use  Smoking status: Never Smoker  Smokeless tobacco: Never Used Substance and Sexual Activity  Alcohol use: No  
 Drug use: Never  Sexual activity: Not on file Lifestyle  Physical activity:  
  Days per week: Not on file Minutes per session: Not on file  Stress: Not on file Relationships  Social connections:  
  Talks on phone: Not on file Gets together: Not on file Attends Jew service: Not on file Active member of club or organization: Not on file Attends meetings of clubs or organizations: Not on file Relationship status: Not on file  Intimate partner violence:  
  Fear of current or ex partner: Not on file Emotionally abused: Not on file Physically abused: Not on file Forced sexual activity: Not on file Other Topics Concern  Not on file Social History Narrative  Not on file ALLERGIES: Patient has no known allergies. Review of Systems Constitutional: Positive for appetite change (decreased) and fever (max. T-102). Respiratory: Negative for shortness of breath. Gastrointestinal: Positive for abdominal pain (RUQ and epigastric), diarrhea, nausea and vomiting. All other systems reviewed and are negative. Vitals:  
 03/20/19 2002 BP: (!) 147/92 Pulse: 94 Resp: 20 Temp: 99 °F (37.2 °C) SpO2: 100% Weight: 124.7 kg (275 lb) Height: 5' 4\" (1.626 m) Physical Exam  
 
Vital signs reviewed. Nursing notes reviewed. Const:  No acute distress, well developed, well nourished Head:  Atraumatic, normocephalic Eyes:  PERRL, conjunctiva normal, no scleral icterus Neck:  Supple, trachea midline Cardiovascular:  RRR, no murmurs, no gallops, no rubs Resp:  No resp distress, no increased work of breathing, no wheezes, no rhonchi, no rales, Abd:  RUQ and epigastric tenderness. Soft, non-distended, no rebound, no guarding, no CVA tenderness :  Deferred MSK:  No pedal edema, normal ROM Neuro:  Alert and oriented x3, no cranial nerve defect Skin:  Warm, dry, intact Psych: normal mood and affect, behavior is normal, judgement and thought content is normal 
 
Note written by Sarah Urena, as dictated by Sheba العلي MD 9:09 PM 
 
MDM Number of Diagnoses or Management Options Abdominal pain, epigastric:  
  
Amount and/or Complexity of Data Reviewed Clinical lab tests: ordered and reviewed Tests in the radiology section of CPT®: ordered and reviewed Review and summarize past medical records: yes Patient Progress Patient progress: stable Pt. Presents to the ER with complaints of epigastric abdominal pain. I suspect that she likely has gastritis/duodenitis v. PUD, likely 2/2 regularly ibuprofen use. No signs of acute cholecystitis on CT. No acute findings on CT. Lipase is pending. Pt. Signed out to Dr. Augie Ward to f/u on the lipase. If that is normal, I will start her on pepcid and zofran. Pt. To f/u with her PCP or return to the ER with worsening sx. Procedures same as pt rep

## 2023-09-06 SDOH — HEALTH STABILITY: PHYSICAL HEALTH: ON AVERAGE, HOW MANY DAYS PER WEEK DO YOU ENGAGE IN MODERATE TO STRENUOUS EXERCISE (LIKE A BRISK WALK)?: 3 DAYS

## 2023-09-06 SDOH — HEALTH STABILITY: PHYSICAL HEALTH: ON AVERAGE, HOW MANY MINUTES DO YOU ENGAGE IN EXERCISE AT THIS LEVEL?: 50 MIN

## 2023-09-06 ASSESSMENT — SOCIAL DETERMINANTS OF HEALTH (SDOH)
WITHIN THE LAST YEAR, HAVE YOU BEEN KICKED, HIT, SLAPPED, OR OTHERWISE PHYSICALLY HURT BY YOUR PARTNER OR EX-PARTNER?: NO
WITHIN THE LAST YEAR, HAVE YOU BEEN AFRAID OF YOUR PARTNER OR EX-PARTNER?: NO
WITHIN THE LAST YEAR, HAVE YOU BEEN HUMILIATED OR EMOTIONALLY ABUSED IN OTHER WAYS BY YOUR PARTNER OR EX-PARTNER?: NO
WITHIN THE LAST YEAR, HAVE TO BEEN RAPED OR FORCED TO HAVE ANY KIND OF SEXUAL ACTIVITY BY YOUR PARTNER OR EX-PARTNER?: NO

## 2023-09-07 ENCOUNTER — OFFICE VISIT (OUTPATIENT)
Facility: CLINIC | Age: 33
End: 2023-09-07
Payer: COMMERCIAL

## 2023-09-07 VITALS
BODY MASS INDEX: 48.36 KG/M2 | SYSTOLIC BLOOD PRESSURE: 152 MMHG | HEIGHT: 64 IN | TEMPERATURE: 98.4 F | WEIGHT: 283.25 LBS | OXYGEN SATURATION: 98 % | DIASTOLIC BLOOD PRESSURE: 96 MMHG | HEART RATE: 78 BPM | RESPIRATION RATE: 20 BRPM

## 2023-09-07 DIAGNOSIS — E66.01 MORBID OBESITY WITH BMI OF 40.0-44.9, ADULT (HCC): ICD-10-CM

## 2023-09-07 DIAGNOSIS — I10 PRIMARY HYPERTENSION: Primary | ICD-10-CM

## 2023-09-07 DIAGNOSIS — E28.2 PCOS (POLYCYSTIC OVARIAN SYNDROME): ICD-10-CM

## 2023-09-07 DIAGNOSIS — R73.03 PRE-DIABETES: ICD-10-CM

## 2023-09-07 DIAGNOSIS — K21.9 GASTROESOPHAGEAL REFLUX DISEASE, UNSPECIFIED WHETHER ESOPHAGITIS PRESENT: ICD-10-CM

## 2023-09-07 PROBLEM — G43.909 MIGRAINES: Status: ACTIVE | Noted: 2023-09-07

## 2023-09-07 PROCEDURE — 3077F SYST BP >= 140 MM HG: CPT

## 2023-09-07 PROCEDURE — 99204 OFFICE O/P NEW MOD 45 MIN: CPT

## 2023-09-07 PROCEDURE — 3080F DIAST BP >= 90 MM HG: CPT

## 2023-09-07 RX ORDER — LISINOPRIL AND HYDROCHLOROTHIAZIDE 12.5; 1 MG/1; MG/1
1 TABLET ORAL DAILY
COMMUNITY
End: 2023-09-07 | Stop reason: ALTCHOICE

## 2023-09-07 RX ORDER — OMEPRAZOLE 20 MG/1
20 CAPSULE, DELAYED RELEASE ORAL DAILY
COMMUNITY

## 2023-09-07 RX ORDER — LISINOPRIL AND HYDROCHLOROTHIAZIDE 25; 20 MG/1; MG/1
1 TABLET ORAL DAILY
Qty: 90 TABLET | Refills: 1 | Status: SHIPPED | OUTPATIENT
Start: 2023-09-07

## 2023-09-07 RX ORDER — LIRAGLUTIDE 6 MG/ML
0.6 INJECTION SUBCUTANEOUS DAILY
Qty: 2 ADJUSTABLE DOSE PRE-FILLED PEN SYRINGE | Refills: 1 | Status: SHIPPED | OUTPATIENT
Start: 2023-09-07

## 2023-09-07 ASSESSMENT — PATIENT HEALTH QUESTIONNAIRE - PHQ9
2. FEELING DOWN, DEPRESSED OR HOPELESS: 0
SUM OF ALL RESPONSES TO PHQ9 QUESTIONS 1 & 2: 0
SUM OF ALL RESPONSES TO PHQ QUESTIONS 1-9: 0
1. LITTLE INTEREST OR PLEASURE IN DOING THINGS: 0

## 2023-09-07 ASSESSMENT — ENCOUNTER SYMPTOMS
RESPIRATORY NEGATIVE: 1
EYES NEGATIVE: 1
GASTROINTESTINAL NEGATIVE: 1

## 2023-09-07 NOTE — PROGRESS NOTES
Chief Complaint   Patient presents with    New Patient     Establish care. Hypertension    Polycystic Ovarian Syndrome    Diabetes     Pre diabetic per patient. Last AiC at Urgent Care was 5.5 on 08/05/2023.     1. Have you been to the ER, urgent care clinic since your last visit? Hospitalized since your last visit? Seen at Urgent Care for HTN 08/05/2023.    2. Have you seen or consulted any other health care providers outside of the [address] [place] [address] since your last visit? Include any pap smears or colon screening. Yes.

## 2023-09-07 NOTE — PROGRESS NOTES
Radha Vasquez is a 34 y.o. female who presents to the office today for the following:    Chief Complaint   Patient presents with    New Patient     Establish care. Hypertension    Polycystic Ovarian Syndrome    Diabetes     Pre diabetic per patient. Last AiC at Urgent Care was 5.5 on 08/05/2023. Referral - General     Wants a referral to OB-GYN for her POC. History reviewed. No pertinent past medical history. History reviewed. No pertinent surgical history. History reviewed. No pertinent family history. Social History     Tobacco Use    Smoking status: Never    Smokeless tobacco: Never   Vaping Use    Vaping Use: Never used   Substance Use Topics    Alcohol use: Never    Drug use: Never        HPI  Patient here to establish as new patient with provider with PMH PCOS, obesity, prediabetes, GERD and migraines. PAP is not up to date. Has not followed with PCP in past. Was seen at [facility] approx 1 month ago for severe throbbing headache and diagnosed with HTN (records scanned in chart with labs). Patient started on lisinopril-HCTZ and patient has been tracking BP at home. Initial BP readings 471-610 systolic with diastolic > 261. Recent BP readings over past 2 weeks 176-718 systolic and diastolic . Headaches have improved over past week. GERD is well controlled with Prilosec at this time. Patient would like referral to OBGYN. Denies menstrual cycle in approx 1 year. Previously on metformin, stopped due to noncompliance. States approx 1.5 years ago seen by GYN and start OCP to assist in cycle regulation however had a 3 month cycle therefore stopped OCP and did not follow up. States that she did not follow up due to lack of insurance at that time. Patient is interested in pregnancy in the near future. Prediabetes - A1C 5.5 at Urgent Care on 8/5/2023. Chief Complaint   Patient presents with    New Patient     Establish care.     Hypertension    Polycystic Ovarian Syndrome

## 2023-10-03 DIAGNOSIS — I10 PRIMARY HYPERTENSION: Primary | ICD-10-CM

## 2023-10-03 RX ORDER — LOSARTAN POTASSIUM 50 MG/1
50 TABLET ORAL DAILY
Qty: 90 TABLET | Refills: 1 | Status: SHIPPED | OUTPATIENT
Start: 2023-10-03

## 2023-10-03 RX ORDER — HYDROCHLOROTHIAZIDE 25 MG/1
25 TABLET ORAL EVERY MORNING
Qty: 90 TABLET | Refills: 1 | Status: SHIPPED | OUTPATIENT
Start: 2023-10-03

## 2023-10-03 NOTE — TELEPHONE ENCOUNTER
Patient endorses vaginal bleeding with lisinopril use now and in past. Will stop now. Start Losartan 50mg once daily in addition to HCTZ 25mg daily. Take as directed.

## 2023-11-14 ENCOUNTER — OFFICE VISIT (OUTPATIENT)
Age: 33
End: 2023-11-14

## 2023-11-14 VITALS
SYSTOLIC BLOOD PRESSURE: 126 MMHG | WEIGHT: 279.31 LBS | HEIGHT: 64 IN | HEART RATE: 72 BPM | DIASTOLIC BLOOD PRESSURE: 79 MMHG | RESPIRATION RATE: 16 BRPM | OXYGEN SATURATION: 100 % | TEMPERATURE: 97.1 F | BODY MASS INDEX: 47.68 KG/M2

## 2023-11-14 DIAGNOSIS — N92.6 IRREGULAR BLEEDING: ICD-10-CM

## 2023-11-14 DIAGNOSIS — Z01.419 GYNECOLOGIC EXAM NORMAL: Primary | ICD-10-CM

## 2023-11-14 DIAGNOSIS — Z12.4 ENCOUNTER FOR SCREENING FOR MALIGNANT NEOPLASM OF CERVIX: ICD-10-CM

## 2023-11-14 RX ORDER — LISINOPRIL AND HYDROCHLOROTHIAZIDE 25; 20 MG/1; MG/1
1 TABLET ORAL DAILY
COMMUNITY
Start: 2023-10-23

## 2023-11-14 NOTE — PROGRESS NOTES
malignant neoplasm of cervix      3. Irregular bleeding    - LH+FSH+ESTROGEN; Future  - Progesterone; Future  - Testosterone, free, total  - Hemoglobin A1C  - Anti Mullerian Hormone; Future  - TSH+Free T4  - Prolactin; Future  - US NON OB TRANSVAGINAL; Future         Return in about 4 weeks (around 12/12/2023) for gyn.

## 2023-11-15 LAB — HBA1C MFR BLD: 5.7 % (ref 4.8–5.6)

## 2023-11-16 LAB
PROGEST SERPL-MCNC: 0.1 NG/ML
PROLACTIN SERPL-MCNC: 7.3 NG/ML (ref 4.8–23.3)

## 2023-11-17 LAB
CYTOLOGIST CVX/VAG CYTO: NORMAL
CYTOLOGY CVX/VAG DOC CYTO: NORMAL
CYTOLOGY CVX/VAG DOC THIN PREP: NORMAL
DX ICD CODE: NORMAL
ESTROGEN SERPL-MCNC: 120 PG/ML
ESTROGEN SERPL-MCNC: 138 PG/ML
FSH SERPL-ACNC: 6.3 MIU/ML
FSH SERPL-ACNC: 6.4 MIU/ML
HPV GENOTYPE REFLEX: NORMAL
HPV I/H RISK 4 DNA CVX QL PROBE+SIG AMP: NEGATIVE
LH SERPL-ACNC: 11.8 MIU/ML
LH SERPL-ACNC: 11.9 MIU/ML
Lab: NORMAL
OTHER STN SPEC: NORMAL
STAT OF ADQ CVX/VAG CYTO-IMP: NORMAL
TESTOST FREE SERPL-MCNC: 1.1 PG/ML (ref 0–4.2)
TESTOST SERPL-MCNC: 31 NG/DL (ref 8–60)

## 2023-11-19 LAB — MIS SERPL-MCNC: 15.4 NG/ML

## 2023-11-20 LAB — MIS SERPL-MCNC: 13.3 NG/ML

## 2023-11-22 LAB
T4 FREE SERPL DIALY-MCNC: 1.1 NG/DL
TSH SERPL-ACNC: 2.6 UU/ML

## 2023-11-28 DIAGNOSIS — N91.2 AMENORRHEA: Primary | ICD-10-CM

## 2023-11-28 RX ORDER — MEDROXYPROGESTERONE ACETATE 10 MG/1
TABLET ORAL
Qty: 10 TABLET | Refills: 0 | Status: SHIPPED | OUTPATIENT
Start: 2023-11-28